# Patient Record
Sex: MALE | Race: WHITE | Employment: OTHER | URBAN - METROPOLITAN AREA
[De-identification: names, ages, dates, MRNs, and addresses within clinical notes are randomized per-mention and may not be internally consistent; named-entity substitution may affect disease eponyms.]

---

## 2019-11-15 ENCOUNTER — HOSPITAL ENCOUNTER (OUTPATIENT)
Dept: CT IMAGING | Age: 64
Discharge: HOME | End: 2019-11-15
Attending: INTERNAL MEDICINE

## 2019-11-15 DIAGNOSIS — Z13.9 ENCOUNTER FOR SCREENING: ICD-10-CM

## 2019-11-15 NOTE — PROGRESS NOTES
Pt seen on [facility]  for CT Heart Scan    PRELIMINARY LSQEE=5646.63    BP=  140/98  Cholestec labs as follows:    YC=186    HDL=36    GZS=552    LE=604    GLUCOSE=83    All results and risk factors discussed with patient; all questions and concern

## 2019-12-10 ENCOUNTER — TELEPHONE (OUTPATIENT)
Dept: OTHER | Facility: HOSPITAL | Age: 64
End: 2019-12-10

## 2019-12-10 NOTE — PROGRESS NOTES
Patient completed heart scan on 11/15/19. He has a calcium score of 1062.63, in addition to his calcium score he has an incidental finding of Dilated Thoracic Aorta (4.0 cm). I spoke with patient today and he is aware of incidental finding.  He has followed

## 2020-01-02 ENCOUNTER — HOSPITAL ENCOUNTER (OUTPATIENT)
Dept: CARDIOLOGY CLINIC | Facility: HOSPITAL | Age: 65
Discharge: HOME | End: 2020-01-02
Attending: INTERNAL MEDICINE

## 2020-01-02 DIAGNOSIS — Z13.9 ENCOUNTER FOR SCREENING: ICD-10-CM
